# Patient Record
Sex: FEMALE | Race: WHITE | URBAN - METROPOLITAN AREA
[De-identification: names, ages, dates, MRNs, and addresses within clinical notes are randomized per-mention and may not be internally consistent; named-entity substitution may affect disease eponyms.]

---

## 2017-03-14 ENCOUNTER — OFFICE VISIT (OUTPATIENT)
Dept: INTERNAL MEDICINE CLINIC | Age: 37
End: 2017-03-14

## 2017-03-14 VITALS
SYSTOLIC BLOOD PRESSURE: 104 MMHG | DIASTOLIC BLOOD PRESSURE: 76 MMHG | BODY MASS INDEX: 27.56 KG/M2 | HEART RATE: 76 BPM | HEIGHT: 70 IN | WEIGHT: 192.54 LBS

## 2017-03-14 DIAGNOSIS — R93.5 ABNORMAL CT OF THE ABDOMEN: ICD-10-CM

## 2017-03-14 DIAGNOSIS — D25.9 UTERINE LEIOMYOMA, UNSPECIFIED LOCATION: ICD-10-CM

## 2017-03-14 DIAGNOSIS — Z76.89 ENCOUNTER TO ESTABLISH CARE: Primary | ICD-10-CM

## 2017-03-14 DIAGNOSIS — R10.9 RIGHT FLANK PAIN: ICD-10-CM

## 2017-03-14 DIAGNOSIS — R10.13 DYSPEPSIA: ICD-10-CM

## 2017-03-14 DIAGNOSIS — K58.1 IRRITABLE BOWEL SYNDROME WITH CONSTIPATION: ICD-10-CM

## 2017-03-14 DIAGNOSIS — R10.11 COLICKY RUQ ABDOMINAL PAIN: ICD-10-CM

## 2017-03-14 PROCEDURE — 99204 OFFICE O/P NEW MOD 45 MIN: CPT | Performed by: NURSE PRACTITIONER

## 2017-03-14 ASSESSMENT — ENCOUNTER SYMPTOMS
BACK PAIN: 1
ABDOMINAL DISTENTION: 1
NAUSEA: 1
DIARRHEA: 1
RESPIRATORY NEGATIVE: 1
ABDOMINAL PAIN: 1
CONSTIPATION: 1
BLOOD IN STOOL: 0

## 2017-03-20 ENCOUNTER — TELEPHONE (OUTPATIENT)
Dept: INTERNAL MEDICINE CLINIC | Age: 37
End: 2017-03-20

## 2017-04-10 ENCOUNTER — HOSPITAL ENCOUNTER (OUTPATIENT)
Dept: NUCLEAR MEDICINE | Age: 37
Discharge: HOME | End: 2017-04-10
Attending: INTERNAL MEDICINE | Admitting: INTERNAL MEDICINE

## 2017-04-10 VITALS — WEIGHT: 182 LBS | BODY MASS INDEX: 26.11 KG/M2

## 2017-04-10 DIAGNOSIS — R10.9 ABDOMINAL PAIN: ICD-10-CM

## 2017-04-10 DIAGNOSIS — R10.11 RUQ PAIN: ICD-10-CM

## 2017-04-10 RX ADMIN — Medication 6 MILLICURIE: at 12:52

## 2017-04-11 ENCOUNTER — OFFICE VISIT (OUTPATIENT)
Dept: INTERNAL MEDICINE CLINIC | Age: 37
End: 2017-04-11

## 2017-04-11 VITALS
DIASTOLIC BLOOD PRESSURE: 72 MMHG | BODY MASS INDEX: 26.2 KG/M2 | WEIGHT: 183 LBS | HEIGHT: 70 IN | SYSTOLIC BLOOD PRESSURE: 112 MMHG | HEART RATE: 68 BPM

## 2017-04-11 DIAGNOSIS — R10.11 COLICKY RUQ ABDOMINAL PAIN: Primary | ICD-10-CM

## 2017-04-11 DIAGNOSIS — K58.1 IRRITABLE BOWEL SYNDROME WITH CONSTIPATION: ICD-10-CM

## 2017-04-11 DIAGNOSIS — R10.13 DYSPEPSIA: ICD-10-CM

## 2017-04-11 PROCEDURE — 99213 OFFICE O/P EST LOW 20 MIN: CPT | Performed by: NURSE PRACTITIONER

## 2017-04-11 ASSESSMENT — ENCOUNTER SYMPTOMS
RESPIRATORY NEGATIVE: 1
ABDOMINAL PAIN: 1
CONSTIPATION: 1
DIARRHEA: 1

## 2017-04-25 ENCOUNTER — HOSPITAL ENCOUNTER (OUTPATIENT)
Dept: ENDOSCOPY | Age: 37
Discharge: HOME | End: 2017-04-25
Attending: INTERNAL MEDICINE | Admitting: INTERNAL MEDICINE

## 2017-04-25 DIAGNOSIS — R10.9 ABDOMINAL PAIN: ICD-10-CM

## 2017-04-25 LAB — HCG(URINE) PREGNANCY TEST: NEGATIVE

## 2017-04-25 RX ORDER — LIDOCAINE HYDROCHLORIDE 10 MG/ML
1 INJECTION, SOLUTION EPIDURAL; INFILTRATION; INTRACAUDAL; PERINEURAL
Status: ACTIVE | OUTPATIENT
Start: 2017-04-25 | End: 2017-04-25

## 2017-04-25 RX ORDER — SODIUM CHLORIDE 0.9 % (FLUSH) 0.9 %
10 SYRINGE (ML) INJECTION EVERY 12 HOURS SCHEDULED
Status: DISCONTINUED | OUTPATIENT
Start: 2017-04-25 | End: 2017-04-26 | Stop reason: HOSPADM

## 2017-04-25 RX ORDER — SODIUM CHLORIDE 0.9 % (FLUSH) 0.9 %
10 SYRINGE (ML) INJECTION PRN
Status: DISCONTINUED | OUTPATIENT
Start: 2017-04-25 | End: 2017-04-26 | Stop reason: HOSPADM

## 2017-04-25 RX ORDER — SODIUM CHLORIDE 9 MG/ML
INJECTION, SOLUTION INTRAVENOUS CONTINUOUS
Status: DISCONTINUED | OUTPATIENT
Start: 2017-04-25 | End: 2017-04-26 | Stop reason: HOSPADM

## 2017-08-11 ENCOUNTER — OFFICE VISIT (OUTPATIENT)
Dept: INTERNAL MEDICINE CLINIC | Age: 37
End: 2017-08-11

## 2017-08-11 VITALS
BODY MASS INDEX: 24.48 KG/M2 | SYSTOLIC BLOOD PRESSURE: 122 MMHG | DIASTOLIC BLOOD PRESSURE: 82 MMHG | HEART RATE: 64 BPM | WEIGHT: 171 LBS | HEIGHT: 70 IN

## 2017-08-11 DIAGNOSIS — R10.11 RUQ ABDOMINAL PAIN: Primary | ICD-10-CM

## 2017-08-11 DIAGNOSIS — R19.7 NAUSEA VOMITING AND DIARRHEA: ICD-10-CM

## 2017-08-11 DIAGNOSIS — R63.4 UNINTENTIONAL WEIGHT LOSS: ICD-10-CM

## 2017-08-11 DIAGNOSIS — R11.2 NAUSEA VOMITING AND DIARRHEA: ICD-10-CM

## 2017-08-11 DIAGNOSIS — Z87.19 HISTORY OF IBS: ICD-10-CM

## 2017-08-11 LAB
A/G RATIO: 1.6 (ref 1.1–2.2)
ALBUMIN SERPL-MCNC: 4.4 G/DL (ref 3.4–5)
ALP BLD-CCNC: 68 U/L (ref 40–129)
ALT SERPL-CCNC: 11 U/L (ref 10–40)
ANION GAP SERPL CALCULATED.3IONS-SCNC: 14 MMOL/L (ref 3–16)
AST SERPL-CCNC: 13 U/L (ref 15–37)
BACTERIA: ABNORMAL /HPF
BASOPHILS ABSOLUTE: 0 K/UL (ref 0–0.2)
BASOPHILS RELATIVE PERCENT: 0.3 %
BILIRUB SERPL-MCNC: 0.3 MG/DL (ref 0–1)
BILIRUBIN URINE: NEGATIVE
BLOOD, URINE: ABNORMAL
BUN BLDV-MCNC: 9 MG/DL (ref 7–20)
CALCIUM SERPL-MCNC: 9.4 MG/DL (ref 8.3–10.6)
CHLORIDE BLD-SCNC: 101 MMOL/L (ref 99–110)
CLARITY: ABNORMAL
CO2: 26 MMOL/L (ref 21–32)
COLOR: YELLOW
COMMENT UA: ABNORMAL
CREAT SERPL-MCNC: 0.6 MG/DL (ref 0.6–1.1)
EOSINOPHILS ABSOLUTE: 0.1 K/UL (ref 0–0.6)
EOSINOPHILS RELATIVE PERCENT: 1.3 %
EPITHELIAL CELLS, UA: 2 /HPF (ref 0–5)
GFR AFRICAN AMERICAN: >60
GFR NON-AFRICAN AMERICAN: >60
GLOBULIN: 2.7 G/DL
GLUCOSE BLD-MCNC: 85 MG/DL (ref 70–99)
GLUCOSE URINE: NEGATIVE MG/DL
HCT VFR BLD CALC: 37.7 % (ref 36–48)
HEMOGLOBIN: 12.6 G/DL (ref 12–16)
HYALINE CASTS: 1 /LPF (ref 0–8)
KETONES, URINE: NEGATIVE MG/DL
LEUKOCYTE ESTERASE, URINE: ABNORMAL
LYMPHOCYTES ABSOLUTE: 2.8 K/UL (ref 1–5.1)
LYMPHOCYTES RELATIVE PERCENT: 36.5 %
MCH RBC QN AUTO: 29.8 PG (ref 26–34)
MCHC RBC AUTO-ENTMCNC: 33.5 G/DL (ref 31–36)
MCV RBC AUTO: 89 FL (ref 80–100)
MICROSCOPIC EXAMINATION: YES
MONOCYTES ABSOLUTE: 0.4 K/UL (ref 0–1.3)
MONOCYTES RELATIVE PERCENT: 5.8 %
NEUTROPHILS ABSOLUTE: 4.2 K/UL (ref 1.7–7.7)
NEUTROPHILS RELATIVE PERCENT: 56.1 %
NITRITE, URINE: NEGATIVE
PDW BLD-RTO: 13.5 % (ref 12.4–15.4)
PH UA: 7.5
PLATELET # BLD: 201 K/UL (ref 135–450)
PMV BLD AUTO: 10.8 FL (ref 5–10.5)
POTASSIUM SERPL-SCNC: 4.6 MMOL/L (ref 3.5–5.1)
PROTEIN UA: NEGATIVE MG/DL
RBC # BLD: 4.23 M/UL (ref 4–5.2)
RBC UA: 8 /HPF (ref 0–4)
SODIUM BLD-SCNC: 141 MMOL/L (ref 136–145)
SPECIFIC GRAVITY UA: 1.02
TOTAL PROTEIN: 7.1 G/DL (ref 6.4–8.2)
URINE REFLEX TO CULTURE: YES
URINE TYPE: ABNORMAL
UROBILINOGEN, URINE: 0.2 E.U./DL
WBC # BLD: 7.5 K/UL (ref 4–11)
WBC UA: 17 /HPF (ref 0–5)

## 2017-08-11 PROCEDURE — 99213 OFFICE O/P EST LOW 20 MIN: CPT | Performed by: NURSE PRACTITIONER

## 2017-08-13 ASSESSMENT — ENCOUNTER SYMPTOMS
VOMITING: 1
ABDOMINAL DISTENTION: 1
RECTAL PAIN: 0
CONSTIPATION: 1
BLOOD IN STOOL: 0
RESPIRATORY NEGATIVE: 1
NAUSEA: 1
DIARRHEA: 1
ABDOMINAL PAIN: 1
SHORTNESS OF BREATH: 0
ANAL BLEEDING: 0

## 2017-08-14 ENCOUNTER — TELEPHONE (OUTPATIENT)
Dept: INTERNAL MEDICINE CLINIC | Age: 37
End: 2017-08-14

## 2017-08-14 ENCOUNTER — OFFICE VISIT (OUTPATIENT)
Dept: SURGERY | Age: 37
End: 2017-08-14

## 2017-08-14 VITALS
HEIGHT: 70 IN | WEIGHT: 171 LBS | BODY MASS INDEX: 24.48 KG/M2 | DIASTOLIC BLOOD PRESSURE: 50 MMHG | SYSTOLIC BLOOD PRESSURE: 98 MMHG

## 2017-08-14 DIAGNOSIS — R10.11 RUQ PAIN: Primary | ICD-10-CM

## 2017-08-14 DIAGNOSIS — N30.00 ACUTE CYSTITIS WITHOUT HEMATURIA: Primary | ICD-10-CM

## 2017-08-14 LAB
ORGANISM: ABNORMAL
URINE CULTURE, ROUTINE: ABNORMAL

## 2017-08-14 PROCEDURE — 99244 OFF/OP CNSLTJ NEW/EST MOD 40: CPT | Performed by: SURGERY

## 2017-08-14 RX ORDER — NITROFURANTOIN 25; 75 MG/1; MG/1
100 CAPSULE ORAL 2 TIMES DAILY
Qty: 10 CAPSULE | Refills: 0 | Status: SHIPPED | OUTPATIENT
Start: 2017-08-14 | End: 2017-08-19

## 2017-08-14 RX ORDER — CALCIUM POLYCARBOPHIL 625 MG 625 MG/1
625 TABLET ORAL DAILY
COMMUNITY
End: 2017-08-14

## 2017-08-14 ASSESSMENT — ENCOUNTER SYMPTOMS
RESPIRATORY NEGATIVE: 1
DIARRHEA: 1
ALLERGIC/IMMUNOLOGIC NEGATIVE: 1
BACK PAIN: 1
EYES NEGATIVE: 1
NAUSEA: 1

## 2017-08-15 ENCOUNTER — TELEPHONE (OUTPATIENT)
Dept: INTERNAL MEDICINE CLINIC | Age: 37
End: 2017-08-15

## 2017-08-15 DIAGNOSIS — R10.11 RUQ PAIN: Primary | ICD-10-CM

## 2017-08-24 RX ORDER — URSODIOL 300 MG/1
300 CAPSULE ORAL
Qty: 60 CAPSULE | Refills: 3 | Status: SHIPPED | OUTPATIENT
Start: 2017-08-24 | End: 2017-11-17

## 2017-09-22 ENCOUNTER — OFFICE VISIT (OUTPATIENT)
Dept: INTERNAL MEDICINE CLINIC | Age: 37
End: 2017-09-22

## 2017-09-22 VITALS
SYSTOLIC BLOOD PRESSURE: 106 MMHG | HEART RATE: 72 BPM | BODY MASS INDEX: 24.34 KG/M2 | WEIGHT: 170 LBS | HEIGHT: 70 IN | DIASTOLIC BLOOD PRESSURE: 72 MMHG

## 2017-09-22 DIAGNOSIS — R19.7 NAUSEA VOMITING AND DIARRHEA: ICD-10-CM

## 2017-09-22 DIAGNOSIS — Z87.19 HISTORY OF IBS: ICD-10-CM

## 2017-09-22 DIAGNOSIS — R63.4 UNINTENTIONAL WEIGHT LOSS: ICD-10-CM

## 2017-09-22 DIAGNOSIS — R10.11 RIGHT UPPER QUADRANT ABDOMINAL PAIN: Primary | ICD-10-CM

## 2017-09-22 DIAGNOSIS — R11.2 NAUSEA VOMITING AND DIARRHEA: ICD-10-CM

## 2017-09-22 PROCEDURE — 99213 OFFICE O/P EST LOW 20 MIN: CPT | Performed by: NURSE PRACTITIONER

## 2017-09-25 ASSESSMENT — ENCOUNTER SYMPTOMS
VOMITING: 1
DIARRHEA: 1
NAUSEA: 1
ABDOMINAL PAIN: 1
BLOOD IN STOOL: 0
SHORTNESS OF BREATH: 0

## 2017-11-16 ENCOUNTER — TELEPHONE (OUTPATIENT)
Dept: INTERNAL MEDICINE CLINIC | Age: 37
End: 2017-11-16

## 2017-11-17 ENCOUNTER — INITIAL CONSULT (OUTPATIENT)
Dept: SURGERY | Age: 37
End: 2017-11-17

## 2017-11-17 ENCOUNTER — TELEPHONE (OUTPATIENT)
Dept: INTERNAL MEDICINE CLINIC | Age: 37
End: 2017-11-17

## 2017-11-17 VITALS — DIASTOLIC BLOOD PRESSURE: 64 MMHG | SYSTOLIC BLOOD PRESSURE: 100 MMHG | WEIGHT: 169 LBS | BODY MASS INDEX: 24.25 KG/M2

## 2017-11-17 DIAGNOSIS — R10.11 RIGHT UPPER QUADRANT PAIN: Primary | ICD-10-CM

## 2017-11-17 PROCEDURE — 99213 OFFICE O/P EST LOW 20 MIN: CPT | Performed by: SURGERY

## 2017-11-17 NOTE — TELEPHONE ENCOUNTER
Pt calling has names of 2 Gastro Drs needs to know who you prefer and then needs a referral sent and is wanting done today. Please call the pt. Thanks.

## 2017-11-17 NOTE — TELEPHONE ENCOUNTER
Tell me about her most recent symptoms. If pain, I would suggest surgeon. If appetite, nausea, vomiting, etc., would suggest GI.

## 2017-11-17 NOTE — PROGRESS NOTES
TONSILLECTOMY      UTERINE FIBROID SURGERY       Social History     Social History    Marital status:      Spouse name: N/A    Number of children: N/A    Years of education: N/A     Occupational History    Not on file. Social History Main Topics    Smoking status: Never Smoker    Smokeless tobacco: Never Used    Alcohol use No    Drug use: No    Sexual activity: Not on file     Other Topics Concern    Not on file     Social History Narrative    No narrative on file      Family History   Problem Relation Age of Onset    Thyroid Disease Mother     Depression Mother     Alcohol Abuse Mother     Cancer Father      leukemia    Heart Surgery Father     Alcohol Abuse Father     Hypertension Maternal Grandmother     Stroke Paternal Grandmother      Current Outpatient Prescriptions   Medication Sig Dispense Refill    traMADol (ULTRAM) 50 MG tablet Take 1 tablet by mouth every 6 hours as needed for Pain . 20 tablet 0    promethazine (PHENERGAN) 25 MG tablet Take 1 tablet by mouth every 6 hours as needed for Nausea WARNING:  May cause drowsiness. May impair ability to operate vehicles or machinery. Do not use in combination with alcohol. 20 tablet 0     No current facility-administered medications for this visit.        Allergies   Allergen Reactions    Penicillins Anaphylaxis    Amoxicillin-Pot Clavulanate Hives     & Trouble breathing    Theophylline      Not sure of reaction        Review of Systems:  Review of systems performed and negative with the exception of the above findings    OBJECTIVE:  /64   Wt 169 lb (76.7 kg)   BMI 24.25 kg/m²      Physical Exam:  General appearance: alert, appears stated age, cooperative and no distress  Head: Normocephalic, without obvious abnormality, atraumatic  Lungs: clear to auscultation bilaterally  Heart: regular rate and rhythm, S1, S2 normal, no murmur, click, rub or gallop  Abdomen: Soft, nondistended, mild right upper quadrant tenderness to deep palpation only without peritonitis, no hernias    Admission on 11/16/2017, Discharged on 11/16/2017   Component Date Value Ref Range Status    WBC 11/16/2017 5.1  4.0 - 11.0 K/uL Final    RBC 11/16/2017 4.57  4.00 - 5.20 M/uL Final    Hemoglobin 11/16/2017 13.5  12.0 - 16.0 g/dL Final    Hematocrit 11/16/2017 39.5  36.0 - 48.0 % Final    MCV 11/16/2017 86.4  80.0 - 100.0 fL Final    MCH 11/16/2017 29.5  26.0 - 34.0 pg Final    MCHC 11/16/2017 34.1  31.0 - 36.0 g/dL Final    RDW 11/16/2017 13.3  12.4 - 15.4 % Final    Platelets 22/20/6155 169  135 - 450 K/uL Final    MPV 11/16/2017 9.9  5.0 - 10.5 fL Final    Neutrophils % 11/16/2017 65.2  % Final    Lymphocytes % 11/16/2017 25.7  % Final    Monocytes % 11/16/2017 7.5  % Final    Eosinophils % 11/16/2017 1.3  % Final    Basophils % 11/16/2017 0.3  % Final    Neutrophils # 11/16/2017 3.3  1.7 - 7.7 K/uL Final    Lymphocytes # 11/16/2017 1.3  1.0 - 5.1 K/uL Final    Monocytes # 11/16/2017 0.4  0.0 - 1.3 K/uL Final    Eosinophils # 11/16/2017 0.1  0.0 - 0.6 K/uL Final    Basophils # 11/16/2017 0.0  0.0 - 0.2 K/uL Final    Sodium 11/16/2017 138  136 - 145 mmol/L Final    Potassium 11/16/2017 3.7  3.5 - 5.1 mmol/L Final    Chloride 11/16/2017 99  99 - 110 mmol/L Final    CO2 11/16/2017 24  21 - 32 mmol/L Final    Anion Gap 11/16/2017 15  3 - 16 Final    Glucose 11/16/2017 100* 70 - 99 mg/dL Final    BUN 11/16/2017 13  7 - 20 mg/dL Final    CREATININE 11/16/2017 0.6  0.6 - 1.1 mg/dL Final    GFR Non- 11/16/2017 >60  >60 Final    Comment: >60 mL/min/1.73m2 EGFR, calc. for ages 25 and older using the  MDRD formula (not corrected for weight), is valid for stable  renal function.  GFR  11/16/2017 >60  >60 Final    Comment: Chronic Kidney Disease: less than 60 ml/min/1.73 sq.m. Kidney Failure: less than 15 ml/min/1.73 sq.m. Results valid for patients 18 years and older.       Calcium 11/16/2017 HIDA with CCK all rule out gallbladder pathology and the patient has no reproducible symptoms with the HIDA. She is unlikely to benefit from cholecystectomy at this time  2. Multiple antibiotic courses this year for urinary tract infection could put the patient at some risk for C. diff colitis. Recommend a stool sample in the near future  3. White-colored stool that is greasy and float may be a symptom of malabsorption and related to pancreas. Patient may benefit from additional stool sample for further diagnosis. Recommend follow-up with GI for this workup and to minimize the number of stool samples required  4. Follow with general surgery as needed    Edson Lamas MD, FACS  11/17/2017  1:47 PM

## 2017-11-20 ENCOUNTER — TELEPHONE (OUTPATIENT)
Dept: INTERNAL MEDICINE CLINIC | Age: 37
End: 2017-11-20

## 2017-11-20 NOTE — TELEPHONE ENCOUNTER
I do not know that provider - unless she means [facility] in [place]. He would be a fine local choice. Alternatively, consider UC given the variety of subspecialties they have.

## 2017-11-28 ENCOUNTER — OFFICE VISIT (OUTPATIENT)
Dept: INTERNAL MEDICINE CLINIC | Age: 37
End: 2017-11-28

## 2017-11-28 VITALS
WEIGHT: 166 LBS | HEIGHT: 70 IN | HEART RATE: 64 BPM | DIASTOLIC BLOOD PRESSURE: 74 MMHG | BODY MASS INDEX: 23.77 KG/M2 | SYSTOLIC BLOOD PRESSURE: 122 MMHG

## 2017-11-28 DIAGNOSIS — Z87.19 HISTORY OF IBS: ICD-10-CM

## 2017-11-28 DIAGNOSIS — R11.2 NAUSEA VOMITING AND DIARRHEA: ICD-10-CM

## 2017-11-28 DIAGNOSIS — R19.5 DISCOLORATION OF STOOL: ICD-10-CM

## 2017-11-28 DIAGNOSIS — R19.7 NAUSEA VOMITING AND DIARRHEA: ICD-10-CM

## 2017-11-28 DIAGNOSIS — R10.11 RUQ PAIN: Primary | ICD-10-CM

## 2017-11-28 DIAGNOSIS — R63.4 UNINTENDED WEIGHT LOSS: ICD-10-CM

## 2017-11-28 PROCEDURE — 99214 OFFICE O/P EST MOD 30 MIN: CPT | Performed by: NURSE PRACTITIONER

## 2017-11-28 RX ORDER — URSODIOL 300 MG/1
300 CAPSULE ORAL
Qty: 60 CAPSULE | Refills: 3 | Status: SHIPPED | OUTPATIENT
Start: 2017-11-28 | End: 2018-11-28

## 2017-11-29 ASSESSMENT — ENCOUNTER SYMPTOMS
SHORTNESS OF BREATH: 0
BLOOD IN STOOL: 0
NAUSEA: 1
VOMITING: 1
DIARRHEA: 1
ABDOMINAL PAIN: 1

## 2017-11-29 NOTE — PROGRESS NOTES
Subjective:      Patient ID: Ines Becerra is a 40 y.o. female. CC: Follow-up abdominal pain, vomiting, diarrhea, weight loss    HPI: The patient presents to the office today for follow-up of abdominal symptoms.       On 3/14, the patient was seen for a new patient appointment. Moustapha Aquino that time, she reported right upper quadrant abdominal pain present since January.  SHe had been evaluated by her past PCP and treated for IBS, UTI, and back pain all without relief of her abdominal pain. The patient has had negative CT (×3) and negative abdominal ultrasound (x2). [address] [place] [address] work has been noncontributory.  The patient was referred to GI for HIDA scan which was negative.  She was seen by Dr. Casper Cotton with [address] She had an EGD and colonoscopy performed which were negative. Despite all this, the patient continued to have right upper quadrant and flank pain so she was referred to general surgery, Dr. Zuleika Rhodes. She was seen by Dr. Zuleika Rhodes on 8/14. He did not feel she required any surgery and referred the patient back to Dr. Casper Cotton for possible functional GI issue or possibility musculoskeletal in nature.     Despite this, the patient continued to have right upper quadrant and flank pain. Pain continued to be exacerbated by fatty foods and carbonated drinks. She was continuing to have nausea and vomiting. This was leading to continued weight loss. The patient was seen by gynecology for fibroids but this did not improve her right upper quadrant pain. Given her continued symptoms, I started her on ursodiol and this medication seemed to be helping quite a lot. She was able to resume eating some fatty foods, with limits. When she last saw GI, they asked her to stop the medication, which she did, and her symptoms have worsened since. On 11/16, the patient went to [facility] ER for abdominal pain. A repeat RUQ US was negative for cholelithiasis or acute cholecystitis.   Labs were normal.  The patient returned to general surgery, Dr. Ignacio Nicolas, on 11/17 for second opinion. She reported white colored stools which are greasy and float which brings up concerns for malabsorption or pancreatic cause. It was recommended second opinion from GI be obtained. Today, she continues to have right upper quadrant and back pain, worse with eating and worse with certain (fatty) foods. She continues to loose weight and has now lost 50 lbs this year. Current Outpatient Prescriptions   Medication Sig Dispense Refill    ursodiol (ACTIGALL) 300 MG capsule Take 1 capsule by mouth 2 times daily (before meals) 60 capsule 3     No current facility-administered medications for this visit. Review of Systems   Constitutional: Positive for unexpected weight change. Negative for chills and fever. Respiratory: Negative for shortness of breath. Cardiovascular: Negative for chest pain. Gastrointestinal: Positive for abdominal pain, diarrhea, nausea and vomiting. Negative for blood in stool. Genitourinary: Negative. Objective:   Physical Exam   Constitutional: She is oriented to person, place, and time. She appears well-developed and well-nourished. No distress. HENT:   Head: Normocephalic and atraumatic. Eyes: Conjunctivae are normal. No scleral icterus. Neck: Neck supple. Cardiovascular: Normal rate and regular rhythm. Pulmonary/Chest: Effort normal and breath sounds normal.   Abdominal: Soft. She exhibits no distension. There is tenderness in the right upper quadrant and epigastric area. There is no rebound and no guarding. Musculoskeletal: Normal range of motion. She exhibits no edema. Neurological: She is alert and oriented to person, place, and time. Skin: Skin is warm and dry. She is not diaphoretic. Psychiatric: She has a normal mood and affect.  Her behavior is normal. Thought content normal.     /74   Pulse 64   Ht 5' 10\" (1.778 m)   Wt 166 lb (75.3 kg)   BMI 23.82 kg/m² Lab Results   Component Value Date     11/16/2017    K 3.7 11/16/2017    CL 99 11/16/2017    CO2 24 11/16/2017    BUN 13 11/16/2017    CREATININE 0.6 11/16/2017    GLUCOSE 100 (H) 11/16/2017    CALCIUM 9.3 11/16/2017    PROT 7.8 11/16/2017    LABALBU 4.3 11/16/2017    BILITOT 0.4 11/16/2017    ALKPHOS 61 11/16/2017    AST 14 (L) 11/16/2017    ALT 12 11/16/2017    LABGLOM >60 11/16/2017    GFRAA >60 11/16/2017    AGRATIO 1.6 08/11/2017    GLOB 2.7 08/11/2017       Lab Results   Component Value Date    WBC 5.1 11/16/2017    HGB 13.5 11/16/2017    HCT 39.5 11/16/2017    MCV 86.4 11/16/2017     11/16/2017           Assessment/Plan:  Bruce Moe was seen today for follow-up. Diagnoses and all orders for this visit:    Right upper quadrant abdominal pain  Nausea vomiting and diarrhea  Unintentional weight loss  History of IBS  - Right upper quadrant pain since January, exacerbated by food, carbonated beverages  - Negative CT ×3  - Negative right upper quadrant ultrasound x2  - Negative labs  - Negative HIDA scan  - Negative EGD  - Negative colonoscopy   - Previously felt to be related to IBS with no relief using antispasmodic per past PCP  - Previously felt to be related to UTI which was treated to resolution per past PCP  - Previously felt to be related to scoliosis/back pain per past PCP. No relief with muscle relaxant and PT.  - Previously felt to be related to uterine fibroids which were treated per GYN  - She did get relief with ursodiol but this was stopped by GI and symptoms worsened following discontinuation     - She has been seen by GI, general surgery (x2), GYN.  - White, floating, greasy stools concerning for malabsorption.    - Weight lose over 50 lbs this year is concerning  - They are interested in second GI opinion.   Will check with UC.  - Will resume ursodiol for now as this is one medication that has helped      Casi Beth, MINDY       Addendum:  Spoke to [facility] provider

## 2017-11-30 ENCOUNTER — TELEPHONE (OUTPATIENT)
Dept: INTERNAL MEDICINE CLINIC | Age: 37
End: 2017-11-30

## 2017-11-30 NOTE — TELEPHONE ENCOUNTER
Edson Mondragon from  GI calling to let us know they have pt scheduled for 12/13 with Dr Jose Boss. Left  for pt to call back so I can give her the info. Records faxed at Ivone's request to [phone].

## 2017-12-12 ENCOUNTER — TELEPHONE (OUTPATIENT)
Dept: INTERNAL MEDICINE CLINIC | Age: 37
End: 2017-12-12

## 2020-07-20 ENCOUNTER — TELEPHONE (OUTPATIENT)
Dept: INTERNAL MEDICINE CLINIC | Age: 40
End: 2020-07-20

## 2020-07-20 ENCOUNTER — NURSE TRIAGE (OUTPATIENT)
Dept: OTHER | Facility: CLINIC | Age: 40
End: 2020-07-20

## 2020-07-20 NOTE — TELEPHONE ENCOUNTER
I am happy to see her in \"clinic\" or virtual visit but if she is worried about COVID she should have a COVID test performed which I understand we are not doing here.

## 2020-07-20 NOTE — TELEPHONE ENCOUNTER
covid testing completed today at [facility]. Reviewed red flag symptoms - instructed to go to the er for worsening sob or chest pain. Managing fever and headache with tylenol prn, reinforced hydration. Patient will follow up with office if covid negative and/or symptoms not improving.

## 2020-07-20 NOTE — TELEPHONE ENCOUNTER
----- Message from Lary Alvarado sent at 7/20/2020  8:46 AM EDT -----  Subject: Message to Provider    QUESTIONS  Information for Provider? Patient  is calling about Farrukh Alicia not   feeling well x five days. Diarrhea    throwing up headache and sore throat. She was sleeping so could not Nurse   Triage her. cb pt about this. call  Hussein Hammond  ---------------------------------------------------------------------------  --------------  [place] INFO  What is the best way for the office to contact you? OK to leave message on   voicemail  Preferred Call Back Phone Number? [phone]  ---------------------------------------------------------------------------  --------------  SCRIPT ANSWERS  Relationship to Patient? Other  Representative Name? Hussein Hammond  Is the Representative on the appropriate HIPAA document in Epic?  Yes

## 2020-07-20 NOTE — TELEPHONE ENCOUNTER
Reason for Disposition   Chest pain or pressure    Answer Assessment - Initial Assessment Questions  1. COVID-19 DIAGNOSIS: \"Who made your Coronavirus (COVID-19) diagnosis? \" \"Was it confirmed by a positive lab test?\" If not diagnosed by a HCP, ask \"Are there lots of cases (community spread) where you live? \" (See public health department website, if unsure)      n/a  2. ONSET: \"When did the COVID-19 symptoms start? \"       July 16th  3. WORST SYMPTOM: \"What is your worst symptom? \" (e.g., cough, fever, shortness of breath, muscle aches)      Diarrhea (x4) Emesis (x1)  4. COUGH: \"Do you have a cough? \" If so, ask: \"How bad is the cough? \"        Cough - np, dry  5. FEVER: \"Do you have a fever? \" If so, ask: \"What is your temperature, how was it measured, and when did it start? \"      She thinks only a low grade temp, if any  6. RESPIRATORY STATUS: \"Describe your breathing? \" (e.g., shortness of breath, wheezing, unable to speak)       No breathing problems - it does hurt when she breathes deep and chest feels a little tight  7. BETTER-SAME-WORSE: Ayush Wolfe you getting better, staying the same or getting worse compared to yesterday? \"  If getting worse, ask, \"In what way? \"      worsening  8. HIGH RISK DISEASE: \"Do you have any chronic medical problems? \" (e.g., asthma, heart or lung disease, weak immune system, etc.)      Seasonal asthma associated with allergies  9. PREGNANCY: \"Is there any chance you are pregnant? \" \"When was your last menstrual period? \"      No   10. OTHER SYMPTOMS: \"Do you have any other symptoms? \"  (e.g., chills, fatigue, headache, loss of smell or taste, muscle pain, sore throat)        Fever (low grad), nausea/vomiting, diarrhea/abdominal pain, dry cough, headache (3/10), chest tightness that increases with deep breaths    Protocols used: CORONAVIRUS (COVID-19) DIAGNOSED OR SUSPECTED-ADULT-AH    Patient c/o fever, diarrhea, nausea/vomiting, shortness of air (chest tightness - hurts when she breathes deeply), cough and headache. Recommended patient see provider today. Care advice provided and flu clinic information provided.

## 2022-02-24 NOTE — TELEPHONE ENCOUNTER
1640
Pt calling wants something for a sinus infection---headache--- pressure--offered her appt andi. Am but has another appt then---can you work her in today or call her something in.? Please call the pt. Thanks.
Pt unable to make the appt due to distance from office. Will possibly go to Urgent Care.
DISPLAY PLAN FREE TEXT